# Patient Record
Sex: MALE | Race: WHITE | NOT HISPANIC OR LATINO | URBAN - METROPOLITAN AREA
[De-identification: names, ages, dates, MRNs, and addresses within clinical notes are randomized per-mention and may not be internally consistent; named-entity substitution may affect disease eponyms.]

---

## 2023-08-14 ENCOUNTER — HOSPITAL ENCOUNTER (OUTPATIENT)
Dept: DATA CONVERSION | Facility: HOSPITAL | Age: 73
Discharge: HOME | End: 2023-08-15

## 2023-08-14 DIAGNOSIS — S22.31XA FRACTURE OF ONE RIB, RIGHT SIDE, INITIAL ENCOUNTER FOR CLOSED FRACTURE: ICD-10-CM

## 2023-08-14 DIAGNOSIS — Z79.82 LONG TERM (CURRENT) USE OF ASPIRIN: ICD-10-CM

## 2023-08-14 DIAGNOSIS — Z79.899 OTHER LONG TERM (CURRENT) DRUG THERAPY: ICD-10-CM

## 2023-08-14 DIAGNOSIS — S50.811A ABRASION OF RIGHT FOREARM, INITIAL ENCOUNTER: ICD-10-CM
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
## 2023-08-14 DIAGNOSIS — S29.9XXA UNSPECIFIED INJURY OF THORAX, INITIAL ENCOUNTER: ICD-10-CM

## 2023-08-14 DIAGNOSIS — Z87.891 PERSONAL HISTORY OF NICOTINE DEPENDENCE: ICD-10-CM

## 2023-08-14 DIAGNOSIS — R10.9 UNSPECIFIED ABDOMINAL PAIN: ICD-10-CM

## 2024-10-18 ENCOUNTER — OFFICE VISIT (OUTPATIENT)
Dept: CARDIOLOGY | Facility: CLINIC | Age: 74
End: 2024-10-18
Payer: MEDICARE

## 2024-10-18 VITALS
SYSTOLIC BLOOD PRESSURE: 132 MMHG | HEART RATE: 66 BPM | OXYGEN SATURATION: 98 % | WEIGHT: 217 LBS | DIASTOLIC BLOOD PRESSURE: 78 MMHG

## 2024-10-18 DIAGNOSIS — I45.10 RBBB: Primary | ICD-10-CM

## 2024-10-18 DIAGNOSIS — Z95.5 HISTORY OF CORONARY ANGIOPLASTY WITH INSERTION OF STENT: ICD-10-CM

## 2024-10-18 PROCEDURE — 93010 ELECTROCARDIOGRAM REPORT: CPT | Performed by: INTERNAL MEDICINE

## 2024-10-18 PROCEDURE — 1126F AMNT PAIN NOTED NONE PRSNT: CPT | Performed by: INTERNAL MEDICINE

## 2024-10-18 PROCEDURE — 1036F TOBACCO NON-USER: CPT | Performed by: INTERNAL MEDICINE

## 2024-10-18 PROCEDURE — 93005 ELECTROCARDIOGRAM TRACING: CPT | Performed by: INTERNAL MEDICINE

## 2024-10-18 PROCEDURE — 99203 OFFICE O/P NEW LOW 30 MIN: CPT | Performed by: INTERNAL MEDICINE

## 2024-10-18 PROCEDURE — 99213 OFFICE O/P EST LOW 20 MIN: CPT | Performed by: INTERNAL MEDICINE

## 2024-10-18 RX ORDER — ASCORBIC ACID 500 MG
500 TABLET ORAL
COMMUNITY

## 2024-10-18 RX ORDER — AMLODIPINE AND BENAZEPRIL HYDROCHLORIDE 5; 10 MG/1; MG/1
1 CAPSULE ORAL
COMMUNITY
Start: 2024-08-12

## 2024-10-18 RX ORDER — PRAVASTATIN SODIUM 40 MG/1
1 TABLET ORAL
COMMUNITY
Start: 2024-08-12

## 2024-10-18 RX ORDER — NAPROXEN SODIUM 220 MG/1
81 TABLET, FILM COATED ORAL ONCE
COMMUNITY

## 2024-10-18 RX ORDER — VIT C/E/ZN/COPPR/LUTEIN/ZEAXAN 250MG-90MG
1000 CAPSULE ORAL
COMMUNITY

## 2024-10-18 ASSESSMENT — ENCOUNTER SYMPTOMS
OCCASIONAL FEELINGS OF UNSTEADINESS: 0
DEPRESSION: 0
LOSS OF SENSATION IN FEET: 0

## 2024-10-18 ASSESSMENT — PAIN SCALES - GENERAL: PAINLEVEL_OUTOF10: 0-NO PAIN

## 2024-10-18 ASSESSMENT — PATIENT HEALTH QUESTIONNAIRE - PHQ9
2. FEELING DOWN, DEPRESSED OR HOPELESS: NOT AT ALL
SUM OF ALL RESPONSES TO PHQ9 QUESTIONS 1 AND 2: 0
1. LITTLE INTEREST OR PLEASURE IN DOING THINGS: NOT AT ALL

## 2024-10-18 NOTE — PROGRESS NOTES
Subjective      Chief Complaint   Patient presents with    [place] patient /established care        Here to establish cardiac care after his previous cardiologist, Dr. Macedo relocated his practice.  He is feeling well with a stable functional capacity, likes to play a lot of golf and angina free with no signs of volume overload.    He does have a history of known coronary artery disease with 2 previous stents placed in sidebranches of his major arteries in 2020.  Does cardiac catheterization/PCI reports are still unavailable.    EKG in our office today confirms sinus rhythm with PACs and right bundle branch block.    His metabolic profile August 2024 shows good hepatic and renal function with sodium 141, potassium 4.5, BUN is 20, creatinine 1.18 with a GFR of 65.  His lipid profile showed triglyceride 102, HDL low at 37, LDL low at 69 which is excellent             Review of Systems   All other systems reviewed and are negative.       Objective   Physical Exam  Constitutional:       Appearance: Normal appearance.   HENT:      Head: Normocephalic and atraumatic.   Eyes:      Pupils: Pupils are equal, round, and reactive to light.   Cardiovascular:      Rate and Rhythm: Normal rate and regular rhythm.      Pulses: Normal pulses.      Heart sounds: Normal heart sounds.   Pulmonary:      Effort: Pulmonary effort is normal.      Breath sounds: Normal breath sounds.   Abdominal:      General: Abdomen is flat. Bowel sounds are normal.      Palpations: Abdomen is soft.   Musculoskeletal:         General: Normal range of motion.      Cervical back: Normal range of motion.   Skin:     General: Skin is warm and dry.   Neurological:      General: No focal deficit present.   Psychiatric:         Mood and Affect: Mood normal.         Judgment: Judgment normal.          Lab Review:   Not applicable    History of coronary angioplasty with insertion of stent  Continue his moderate dose pravastatin and low-dose aspirin and follow-up in me  in 6 months.    Continue commitment to a low carbohydrate [place]/Mediterranean dietary lifestyle daily exercise/walking

## 2024-10-18 NOTE — ASSESSMENT & PLAN NOTE
Continue his moderate dose pravastatin and low-dose aspirin and follow-up in me in 6 months.    Continue commitment to a low carbohydrate [employer]/Mediterranean dietary lifestyle daily exercise/walking

## 2025-04-18 ENCOUNTER — OFFICE VISIT (OUTPATIENT)
Facility: CLINIC | Age: 75
End: 2025-04-18
Payer: MEDICARE

## 2025-04-18 VITALS
OXYGEN SATURATION: 98 % | DIASTOLIC BLOOD PRESSURE: 80 MMHG | HEART RATE: 67 BPM | WEIGHT: 228 LBS | SYSTOLIC BLOOD PRESSURE: 138 MMHG

## 2025-04-18 DIAGNOSIS — E78.00 PURE HYPERCHOLESTEROLEMIA: Primary | ICD-10-CM

## 2025-04-18 PROCEDURE — 99214 OFFICE O/P EST MOD 30 MIN: CPT | Performed by: INTERNAL MEDICINE

## 2025-04-18 PROCEDURE — 1126F AMNT PAIN NOTED NONE PRSNT: CPT | Performed by: INTERNAL MEDICINE

## 2025-04-18 PROCEDURE — 1036F TOBACCO NON-USER: CPT | Performed by: INTERNAL MEDICINE

## 2025-04-18 PROCEDURE — 1159F MED LIST DOCD IN RCRD: CPT | Performed by: INTERNAL MEDICINE

## 2025-04-18 ASSESSMENT — ENCOUNTER SYMPTOMS
DEPRESSION: 0
LOSS OF SENSATION IN FEET: 0
OCCASIONAL FEELINGS OF UNSTEADINESS: 0

## 2025-04-18 ASSESSMENT — PAIN SCALES - GENERAL: PAINLEVEL_OUTOF10: 0-NO PAIN

## 2025-04-18 ASSESSMENT — LIFESTYLE VARIABLES: TOTAL SCORE: 0

## 2025-04-18 NOTE — PROGRESS NOTES
Subjective      Chief Complaint   Patient presents with    6 MONTH FOLLOW UP        Here for coronary artery disease follow-up, remains angina free with a normal functional capacity, remains on aspirin and statins           Previous: He does have a history of known coronary artery disease with 2 previous stents placed in sidebranches of his major arteries in 2020.  Does cardiac catheterization/PCI reports are still unavailable.     EKG in our office confirms sinus rhythm with PACs and right bundle branch block.             Review of Systems   All other systems reviewed and are negative.       Objective   Physical Exam  Constitutional:       Appearance: Normal appearance.   HENT:      Head: Normocephalic and atraumatic.   Eyes:      Pupils: Pupils are equal, round, and reactive to light.   Cardiovascular:      Rate and Rhythm: Normal rate and regular rhythm.      Pulses: Normal pulses.      Heart sounds: Normal heart sounds.   Pulmonary:      Effort: Pulmonary effort is normal.      Breath sounds: Normal breath sounds.   Abdominal:      General: Abdomen is flat. Bowel sounds are normal.      Palpations: Abdomen is soft.   Musculoskeletal:         General: Normal range of motion.      Cervical back: Normal range of motion.   Skin:     General: Skin is warm and dry.   Neurological:      General: No focal deficit present.   Psychiatric:         Mood and Affect: Mood normal.         Judgment: Judgment normal.          Lab Review:   Not applicable    History of coronary angioplasty with insertion of stent  Continue on a Mediterranean dietary lifestyle which we discussed and moderate dose pravastatin appropriate for his age group with annual lipid profiles and metabolic profiles.    Pure hypercholesterolemia  Last lipid profile in 2024 shows aggressive LDL reduction on his present dose of pravastatin with annual lipid and metabolic profiles from his PCP

## 2025-04-18 NOTE — ASSESSMENT & PLAN NOTE
Continue on a Mediterranean dietary lifestyle which we discussed and moderate dose pravastatin appropriate for his age group with annual lipid profiles and metabolic profiles.

## 2025-04-18 NOTE — ASSESSMENT & PLAN NOTE
Last lipid profile in 2024 shows aggressive LDL reduction on his present dose of pravastatin with annual lipid and metabolic profiles from his PCP